# Patient Record
Sex: MALE | Race: WHITE
[De-identification: names, ages, dates, MRNs, and addresses within clinical notes are randomized per-mention and may not be internally consistent; named-entity substitution may affect disease eponyms.]

---

## 2023-02-10 ENCOUNTER — NURSE TRIAGE (OUTPATIENT)
Dept: OTHER | Facility: CLINIC | Age: 28
End: 2023-02-10

## 2023-02-20 ENCOUNTER — OFFICE VISIT (OUTPATIENT)
Dept: FAMILY MEDICINE CLINIC | Facility: CLINIC | Age: 28
End: 2023-02-20
Payer: COMMERCIAL

## 2023-02-20 VITALS
OXYGEN SATURATION: 98 % | DIASTOLIC BLOOD PRESSURE: 74 MMHG | BODY MASS INDEX: 30.79 KG/M2 | SYSTOLIC BLOOD PRESSURE: 122 MMHG | WEIGHT: 196.2 LBS | HEIGHT: 67 IN | HEART RATE: 97 BPM

## 2023-02-20 DIAGNOSIS — G89.29 CHRONIC LEFT-SIDED LOW BACK PAIN WITH LEFT-SIDED SCIATICA: ICD-10-CM

## 2023-02-20 DIAGNOSIS — M54.42 CHRONIC LEFT-SIDED LOW BACK PAIN WITH LEFT-SIDED SCIATICA: ICD-10-CM

## 2023-02-20 DIAGNOSIS — Z00.00 HEALTHCARE MAINTENANCE: ICD-10-CM

## 2023-02-20 DIAGNOSIS — K62.5 BRIGHT RED RECTAL BLEEDING: Primary | ICD-10-CM

## 2023-02-20 PROCEDURE — 99203 OFFICE O/P NEW LOW 30 MIN: CPT | Performed by: FAMILY MEDICINE

## 2023-02-20 SDOH — ECONOMIC STABILITY: INCOME INSECURITY: HOW HARD IS IT FOR YOU TO PAY FOR THE VERY BASICS LIKE FOOD, HOUSING, MEDICAL CARE, AND HEATING?: NOT HARD AT ALL

## 2023-02-20 SDOH — ECONOMIC STABILITY: FOOD INSECURITY: WITHIN THE PAST 12 MONTHS, YOU WORRIED THAT YOUR FOOD WOULD RUN OUT BEFORE YOU GOT MONEY TO BUY MORE.: NEVER TRUE

## 2023-02-20 SDOH — ECONOMIC STABILITY: FOOD INSECURITY: WITHIN THE PAST 12 MONTHS, THE FOOD YOU BOUGHT JUST DIDN'T LAST AND YOU DIDN'T HAVE MONEY TO GET MORE.: NEVER TRUE

## 2023-02-20 SDOH — ECONOMIC STABILITY: HOUSING INSECURITY
IN THE LAST 12 MONTHS, WAS THERE A TIME WHEN YOU DID NOT HAVE A STEADY PLACE TO SLEEP OR SLEPT IN A SHELTER (INCLUDING NOW)?: NO

## 2023-02-20 ASSESSMENT — PATIENT HEALTH QUESTIONNAIRE - PHQ9
SUM OF ALL RESPONSES TO PHQ QUESTIONS 1-9: 0
SUM OF ALL RESPONSES TO PHQ QUESTIONS 1-9: 0
SUM OF ALL RESPONSES TO PHQ9 QUESTIONS 1 & 2: 0
1. LITTLE INTEREST OR PLEASURE IN DOING THINGS: 0
SUM OF ALL RESPONSES TO PHQ QUESTIONS 1-9: 0
2. FEELING DOWN, DEPRESSED OR HOPELESS: 0
SUM OF ALL RESPONSES TO PHQ QUESTIONS 1-9: 0

## 2023-02-20 ASSESSMENT — ENCOUNTER SYMPTOMS: RESPIRATORY NEGATIVE: 1

## 2023-02-20 NOTE — PROGRESS NOTES
PROGRESS NOTE    SUBJECTIVE:   Abiel Carranza is a 33 y.o. male seen for a follow up visit regarding   Chief Complaint   Patient presents with    New Patient     Establish care  Reports 2-3 weeks ago he noticed blood in his stool; believes it was related to taking a lot of Aleve. Reports blood stopped when he discontinued Aleve. Had upset stomach and discomfort. Complains of back pain since he started working and graduated from [employer]. Believes it is left sided sciatic pain. HPI:  New patient, wanting to establish primary care with us. Reports a long history of back pain, since high school. Having to take Aleve daily for pain. Recently developed some rectal bleeding, and thought maybe it was from his Aleve. He denies having epigastric pain presently. He denies melena. He denies feeling any bulges or hemorrhoids around his rectum when he showers. Back pain has not been worked up. He works as a , having difficulty with his duties, has to do a lot of bending. Patient denies family history of colon cancer, colitis. He denies diarrhea. He is having no abdominal pain presently. Reviewed and updated this visit by provider:  Tobacco  Allergies  Meds  Problems  Med Hx  Surg Hx  Fam Hx           Review of Systems   Respiratory: Negative. Cardiovascular: Negative. OBJECTIVE:  Vitals:    02/20/23 1640   BP: 122/74   Site: Left Upper Arm   Cuff Size: Large Adult   Pulse: 97   SpO2: 98%   Weight: 196 lb 3.2 oz (89 kg)   Height: 5' 7\" (1.702 m)        Physical Exam   General: Alert and oriented x3, well-appearing  Neck: No adenopathy, thyromegaly or thyroid nodules  Pulmonary: Normal effort, good airflow, no rales or rhonchi  CVS: Regular rate and rhythm, normal S1, S2, no S3 or S4, no murmurs; no carotid bruits, 2+ pedal pulses  Abdomen: Soft, nontender, no mass organomegaly  Musculoskeletal: Mild tenderness in the low back, straight leg raising test is negative.   Rectal: Deferred (referring for colonoscopy)    Medical problems and test results were reviewed with the patient today. No results found for this or any previous visit (from the past 672 hour(s)). No results found for any visits on 02/20/23. ASSESSMENT and PLAN    1. Bright red rectal bleeding  -     Sunday Giacomo Menard MD, Gastroenterology, [place]  2. Chronic left-sided low back pain with left-sided sciatica  -     [address] [place] [address], [facility]   3. Healthcare maintenance  -     Basic Metabolic Panel; Future  -     CBC with Auto Differential; Future  -     Hepatic Function Panel; Future  -     Hepatitis C Antibody; Future  -     HIV 1/2 Ag/Ab, 4TH Generation,W Rflx Confirm; Future  -     Lipid Panel; Future  -     TSH; Future          Return if symptoms worsen or fail to improve.        Che Dowling MD

## 2023-03-06 ENCOUNTER — OFFICE VISIT (OUTPATIENT)
Dept: ORTHOPEDIC SURGERY | Age: 28
End: 2023-03-06
Payer: COMMERCIAL

## 2023-03-06 VITALS — BODY MASS INDEX: 31.67 KG/M2 | HEIGHT: 67 IN | WEIGHT: 201.8 LBS

## 2023-03-06 DIAGNOSIS — M51.36 LUMBAR DEGENERATIVE DISC DISEASE: ICD-10-CM

## 2023-03-06 DIAGNOSIS — M54.50 LOW BACK PAIN, UNSPECIFIED BACK PAIN LATERALITY, UNSPECIFIED CHRONICITY, UNSPECIFIED WHETHER SCIATICA PRESENT: Primary | ICD-10-CM

## 2023-03-06 DIAGNOSIS — M54.16 LUMBAR RADICULOPATHY: ICD-10-CM

## 2023-03-06 PROCEDURE — 99204 OFFICE O/P NEW MOD 45 MIN: CPT | Performed by: NURSE PRACTITIONER

## 2023-03-06 RX ORDER — CYCLOBENZAPRINE HCL 10 MG
10 TABLET ORAL 3 TIMES DAILY PRN
Qty: 21 TABLET | Refills: 0 | Status: SHIPPED | OUTPATIENT
Start: 2023-03-06 | End: 2023-03-16

## 2023-03-06 RX ORDER — CELECOXIB 200 MG/1
200 CAPSULE ORAL DAILY PRN
Qty: 30 CAPSULE | Refills: 0 | Status: SHIPPED | OUTPATIENT
Start: 2023-03-06

## 2023-03-06 NOTE — PROGRESS NOTES
Name: Fariha Park  YOB: 1995  Gender: male  MRN: [id number]    CC: Chronic back pain, left leg pain    HPI: This is a 33y.o. year old male who has a longstanding history of back pain. Recently has gotten worse over the past 6 months. He is a  and does a lot of bending and twisting. It hurts across the lower back primarily at L5 then he has pain that radiates down the left posterior leg at times all the way to his foot. He was taking Aleve and found this helpful however was concerned about a possible bleeding hemorrhoid. Also had a little bit of GI upset that resolved when he stopped taking the Aleve. Denies any gastric ulcer history. The patient denies any change in bowel or bladder function since the onset of the symptoms. he  has not had lumbar surgery in the past.      Thus far, the patient has tried tylenol    Current pain level: Activities limited by pain:        AMB PAIN ASSESSMENT 3/6/2023   Location of Pain Back   Severity of Pain 10   Frequency of Pain Constant   Limiting Behavior Yes   Result of Injury No   Work-Related Injury No   Are there other pain locations you wish to document? No            ROS/Meds/PSH/PMH/FH/SH: I personally reviewed the patient's collected intake data. Below are the pertinents:    No Known Allergies      Current Outpatient Medications:     celecoxib (CELEBREX) 200 MG capsule, Take 1 capsule by mouth daily as needed for Pain, Disp: 30 capsule, Rfl: 0    cyclobenzaprine (FLEXERIL) 10 MG tablet, Take 1 tablet by mouth 3 times daily as needed for Muscle spasms, Disp: 21 tablet, Rfl: 0    History reviewed. No pertinent surgical history. There is no problem list on file for this patient. Tobacco:  reports that he has never smoked. He has quit using smokeless tobacco.  His smokeless tobacco use included chew.   Alcohol:   Social History     Substance and Sexual Activity   Alcohol Use Yes    Alcohol/week: 3.0 standard drinks    Types: 3 Cans of beer per week          Physical Exam:   BMI: Body mass index is 31.8 kg/m². GENERAL:  Adult in no acute distress, well developed, well nourished Patient is appropriately conversant  MSK:  Examination of the lumbar spine reveals paraspinal tenderness , facet tenderness   There is moderate tenderness to palpation along the spinous processes and paraspinal musculature. The patient ambulates with a normal gait. ROM of bilateral hip(s) reveals no irritability. NEURO:  Cranial nerves grossly intact. No motor deficits. Straight leg testing is negative bilaterally   Sensory testing reveals intact sensation to light touch and in the distribution of the L3-S1 dermatomes bilaterally  Ankle jerk is negative for clonus    Reflexes   Right Left   Quadriceps (L4) 2 2   Achilles (S1) 2 2     Strength testing in the lower extremity reveals the following based on the 5 point grading scale:     HF (L2) H Ab (L5) KE (L3/4) ADF (L4) EHL (L5) A Ev (S1) APF (S1)   Right 5 5 5 5 5 5 5   Left 5 5 5 5 5 5 5     PSYCH:  Alert and oriented X 3. Appropriate affect. Intact judgment and insight. Radiographic Studies:     AP, lateral and spot views of the lumbar spine:      there is good lordosis. There is moderate disc space narrowing at L5-S1. No fractures or lesions. Interpretation: L5-S1 degenerative disc disease      Assessment/Plan:       Diagnosis Orders   1. Low back pain, unspecified back pain laterality, unspecified chronicity, unspecified whether sciatica present  XR LUMBAR SPINE (2-3 VIEWS)      2. Lumbar radiculopathy        3. Lumbar degenerative disc disease            This patient's clinical history and physical exam is consistent with a left  L-5 and S-1 lumbar radiculopathy. This is associated with disc degeneration at L5-S1. I told patient I will give him Celebrex to utilize sparingly. I did tell him to take 20 mg of Pepcid with this medication for gastric protectant.   If he develops any signs of any kind of bleeding to discontinue the medication. We did discuss that Celebrex is easier on the stomach. Also has less tendency to aggravate any kind of bleeding. Will add a muscle relaxer. He will complete a home exercise program if no improvement will look at getting an MRI of the lumbar spine. We discussed the natural history of lumbar radiculopathy in that many of these patients have near complete resolution of their symptoms within eight to twelve weeks with conservative care. We discussed that conservative treatments typically start with activity modification, and medication followed by physical therapy as symptoms allow. Oral and/or epidural steroids are other options. I also discussed potential surgical options if the symptoms fail to improve or there is a progressive neurologic deficit and conservative management has been exhausted. We discussed that surgery is not typically a reliable treatment for isolated back pain, but is usually very reliable in relieving buttock and leg symptoms.        - A home exercise program was prescribed for stretching and strengthening. A list of exercises was provided. - NSAID: The patient is agreeable to a trial of nonsteroidal anti-inflammatory drugs (NSAIDs). We discussed risks associated with their use, including GI tract or other bleeding, and also some cardiac risk. Instructions were given to discontinue the NSAID if there is any sign of GI bleed, chest pain, or shortness of breath. Continued use of NSAIDS is recommended to be managed by PCP to monitor kidney and liver function.  - Muscle Relaxant: The patient was prescribed a muscle relaxant. The patient understands that this is a temporary measure to bring acute spasm under control.       4 This is a undiagnosed new problem with uncertain prognosis    Orders Placed This Encounter   Medications    celecoxib (CELEBREX) 200 MG capsule     Sig: Take 1 capsule by mouth daily as needed for Pain     Dispense: 30 capsule     Refill:  0    cyclobenzaprine (FLEXERIL) 10 MG tablet     Sig: Take 1 tablet by mouth 3 times daily as needed for Muscle spasms     Dispense:  21 tablet     Refill:  0        Orders Placed This Encounter   Procedures    XR LUMBAR SPINE (2-3 VIEWS)            Return in about 6 weeks (around 4/17/2023). ARTI Reilly - CNP  03/06/23      Elements of this note were created using speech recognition software. As such, errors of speech recognition may be present.

## 2023-04-03 RX ORDER — CELECOXIB 200 MG/1
200 CAPSULE ORAL DAILY PRN
Qty: 30 CAPSULE | Refills: 0 | OUTPATIENT
Start: 2023-04-03

## 2023-04-17 ENCOUNTER — OFFICE VISIT (OUTPATIENT)
Dept: ORTHOPEDIC SURGERY | Age: 28
End: 2023-04-17
Payer: COMMERCIAL

## 2023-04-17 VITALS — HEIGHT: 67 IN | WEIGHT: 201 LBS | BODY MASS INDEX: 31.55 KG/M2

## 2023-04-17 DIAGNOSIS — M54.16 LUMBAR RADICULOPATHY: Primary | ICD-10-CM

## 2023-04-17 DIAGNOSIS — M51.36 LUMBAR DEGENERATIVE DISC DISEASE: ICD-10-CM

## 2023-04-17 PROCEDURE — 99214 OFFICE O/P EST MOD 30 MIN: CPT | Performed by: NURSE PRACTITIONER

## 2023-04-17 RX ORDER — CELECOXIB 200 MG/1
200 CAPSULE ORAL DAILY PRN
Qty: 30 CAPSULE | Refills: 2 | Status: SHIPPED | OUTPATIENT
Start: 2023-04-17

## 2023-04-17 NOTE — PROGRESS NOTES
standard drinks    Types: 3 Cans of beer per week          Radiographic Studies:       MRI Results (most recent):      Assessment/Plan:        ICD-10-CM    1. Lumbar radiculopathy  M54.16       2. Lumbar degenerative disc disease  M51.36            Patient is doing very well with Celebrex and home exercises. We discussed possibly proceeding with an MRI of the lumbar spine but he does not feel that this is necessary at the current time. Therefore we will just continue the Celebrex. Patient educated on long-term effects of NSAID use. At any time he can follow-up and we will be happy to order an MRI of the lumbar spine if he has elevated levels of pain.    -The patient will be treated observantly with self directed symptomatic care. Instructions were given to follow up if there is any neurologic or functional decline. - A home exercise program was prescribed for stretching and strengthening. A list of exercises was provided. - NSAID: The patient is agreeable to a trial of nonsteroidal anti-inflammatory drugs (NSAIDs). We discussed risks associated with their use, including GI tract or other bleeding, and also some cardiac risk. Instructions were given to discontinue the NSAID if there is any sign of GI bleed, chest pain, or shortness of breath. Continued use of NSAIDS is recommended to be managed by PCP to monitor kidney and liver function. Orders Placed This Encounter   Medications    celecoxib (CELEBREX) 200 MG capsule     Sig: Take 1 capsule by mouth daily as needed for Pain     Dispense:  30 capsule     Refill:  2        No orders of the defined types were placed in this encounter. 4 This is a chronic illness/condition with exacerbation and progression      No follow-ups on file. ARTI Dixon CNP  04/17/23      Elements of this note were created using speech recognition software. As such, errors of speech recognition may be present.

## 2023-05-30 ENCOUNTER — NURSE TRIAGE (OUTPATIENT)
Dept: OTHER | Facility: CLINIC | Age: 28
End: 2023-05-30

## 2023-05-30 NOTE — TELEPHONE ENCOUNTER
Location of patient: [place]    Received call from Woodfin Opitz at [facility] with [employer] Subjective: Caller states \"Saturday I got up and wasn't feeling too well. Saturday night it was hard for me to go to sleep, I kept waking up from sweats. I was getting really hot, then really cold. Sunday I threw up and I've kind of been nauseous ever since. I feel a little better today. I was able to put something in my stomach and not throw it back up. I'm still having diarrhea. \"     Current Symptoms: diarrhea 4-5 times in 3 days, nausea, sweats/chills at night-improving, vomiting x1, urinating less frequently, lightheaded, dry mouth    Onset: 4 days ago; improving    Associated Symptoms: reduced appetite, reduced fluid intake, diarrhea    Pain Severity: Denies    Temperature: Unable to check    What has been tried: bland diet    LMP: NA Pregnant: NA    Recommended disposition: Go to ED/UCC Now (Or to Office with PCP Approval). Patient agreeable. Care advice provided, patient verbalizes understanding; denies any other questions or concerns; instructed to call back for any new or worsening symptoms. Writer provided warm transfer to [place] at [facility] for 2nd Level Triage. Attention Provider: Thank you for allowing me to participate in the care of your patient. The patient was connected to triage in response to information provided to the ECC/PSC. Please do not respond through this encounter as the response is not directed to a shared pool.     Reason for Disposition   Drinking very little and has signs of dehydration (e.g., no urine > 12 hours, very dry mouth, very lightheaded)    Protocols used: Diarrhea-ADULT-[place]

## 2023-05-31 ENCOUNTER — OFFICE VISIT (OUTPATIENT)
Dept: FAMILY MEDICINE CLINIC | Facility: CLINIC | Age: 28
End: 2023-05-31
Payer: COMMERCIAL

## 2023-05-31 VITALS
WEIGHT: 199.4 LBS | HEART RATE: 85 BPM | BODY MASS INDEX: 31.3 KG/M2 | SYSTOLIC BLOOD PRESSURE: 130 MMHG | OXYGEN SATURATION: 98 % | DIASTOLIC BLOOD PRESSURE: 82 MMHG | TEMPERATURE: 98.4 F | HEIGHT: 67 IN

## 2023-05-31 DIAGNOSIS — K52.9 ACUTE GASTROENTERITIS: Primary | ICD-10-CM

## 2023-05-31 PROCEDURE — 99213 OFFICE O/P EST LOW 20 MIN: CPT | Performed by: FAMILY MEDICINE

## 2023-05-31 RX ORDER — ONDANSETRON 4 MG/1
4 TABLET, ORALLY DISINTEGRATING ORAL EVERY 8 HOURS PRN
Qty: 20 TABLET | Refills: 1 | Status: SHIPPED | OUTPATIENT
Start: 2023-05-31

## 2023-05-31 ASSESSMENT — PATIENT HEALTH QUESTIONNAIRE - PHQ9
SUM OF ALL RESPONSES TO PHQ QUESTIONS 1-9: 0
2. FEELING DOWN, DEPRESSED OR HOPELESS: 0
1. LITTLE INTEREST OR PLEASURE IN DOING THINGS: 0
SUM OF ALL RESPONSES TO PHQ9 QUESTIONS 1 & 2: 0
SUM OF ALL RESPONSES TO PHQ QUESTIONS 1-9: 0

## 2023-05-31 ASSESSMENT — ENCOUNTER SYMPTOMS: RESPIRATORY NEGATIVE: 1

## 2023-05-31 NOTE — PROGRESS NOTES
PROGRESS NOTE    SUBJECTIVE:   Jody Vincent is a 33 y.o. male seen for a follow up visit regarding   Chief Complaint   Patient presents with    Nausea & Vomiting     Complains of nausea, vomiting (once), headache (Saturday) and diarrhea x 4 days. Reports this morning his bowel movement was dark yellow brownish color. HPI:  Sick this weekend, headache initially, with cold sweats, then nausea, vomited once, has had diarrhea. Better today, ate breakfast this a.m. w/o nausea. Reviewed and updated this visit by provider:  Tobacco  Allergies  Meds  Problems  Med Hx  Surg Hx  Fam Hx           Review of Systems   Respiratory: Negative. Cardiovascular: Negative. OBJECTIVE:  Vitals:    05/31/23 0955   BP: 130/82   Site: Left Upper Arm   Cuff Size: Medium Adult   Pulse: 85   Temp: 98.4 °F (36.9 °C)   TempSrc: Oral   SpO2: 98%   Weight: 199 lb 6.4 oz (90.4 kg)   Height: 5' 6.8\" (1.697 m)        Physical Exam   General: Alert and oriented x3, well-appearing  Neck: No adenopathy, thyromegaly or thyroid nodules  Pulmonary: Normal effort, good airflow, no rales or rhonchi  CVS: Regular rate and rhythm, normal S1, S2, no S3 or S4, no murmurs; no carotid bruits, 2+ pedal pulses  Abdomen: Nondistended, normal bowel sounds, soft without mass organomegaly    Medical problems and test results were reviewed with the patient today. No results found for this or any previous visit (from the past 672 hour(s)). No results found for any visits on 05/31/23. ASSESSMENT and PLAN    1. Acute gastroenteritis  -     ondansetron (ZOFRAN-ODT) 4 MG disintegrating tablet; Take 1 tablet by mouth every 8 hours as needed for Nausea or Vomiting, Disp-20 tablet, R-1Normal         No follow-ups on file.        Link MD Duncan

## 2023-06-26 ENCOUNTER — TELEPHONE (OUTPATIENT)
Dept: ORTHOPEDIC SURGERY | Age: 28
End: 2023-06-26

## 2023-06-26 RX ORDER — CELECOXIB 200 MG/1
200 CAPSULE ORAL DAILY
Qty: 30 CAPSULE | Refills: 2 | Status: SHIPPED | OUTPATIENT
Start: 2023-06-26

## 2023-08-16 ENCOUNTER — TELEPHONE (OUTPATIENT)
Dept: FAMILY MEDICINE CLINIC | Facility: CLINIC | Age: 28
End: 2023-08-16

## 2023-09-19 RX ORDER — CELECOXIB 200 MG/1
CAPSULE ORAL DAILY
Qty: 30 CAPSULE | Refills: 2 | OUTPATIENT
Start: 2023-09-19

## 2024-09-18 ENCOUNTER — TELEPHONE (OUTPATIENT)
Dept: ORTHOPEDIC SURGERY | Age: 29
End: 2024-09-18

## 2024-09-23 ENCOUNTER — OFFICE VISIT (OUTPATIENT)
Dept: ORTHOPEDIC SURGERY | Age: 29
End: 2024-09-23
Payer: COMMERCIAL

## 2024-09-23 DIAGNOSIS — S92.215A CLOSED NONDISPLACED FRACTURE OF CUBOID OF LEFT FOOT, INITIAL ENCOUNTER: Primary | ICD-10-CM

## 2024-09-23 DIAGNOSIS — R52 PAIN: ICD-10-CM

## 2024-09-23 PROCEDURE — 99204 OFFICE O/P NEW MOD 45 MIN: CPT | Performed by: PHYSICIAN ASSISTANT

## 2024-09-23 PROCEDURE — L4361 PNEUMA/VAC WALK BOOT PRE OTS: HCPCS | Performed by: PHYSICIAN ASSISTANT

## 2024-09-23 RX ORDER — MELOXICAM 15 MG/1
15 TABLET ORAL DAILY
Qty: 30 TABLET | Refills: 3 | Status: SHIPPED | OUTPATIENT
Start: 2024-09-23

## 2024-10-16 ENCOUNTER — OFFICE VISIT (OUTPATIENT)
Dept: ORTHOPEDIC SURGERY | Age: 29
End: 2024-10-16
Payer: COMMERCIAL

## 2024-10-16 DIAGNOSIS — S92.215D CLOSED NONDISPLACED FRACTURE OF CUBOID OF LEFT FOOT WITH ROUTINE HEALING, SUBSEQUENT ENCOUNTER: ICD-10-CM

## 2024-10-16 DIAGNOSIS — R52 PAIN: Primary | ICD-10-CM

## 2024-10-16 PROCEDURE — 99213 OFFICE O/P EST LOW 20 MIN: CPT | Performed by: PHYSICIAN ASSISTANT

## 2024-10-16 PROCEDURE — L1902 AFO ANKLE GAUNTLET PRE OTS: HCPCS | Performed by: PHYSICIAN ASSISTANT

## 2024-10-16 NOTE — PROGRESS NOTES
Name: Arvin Soliz  YOB: 1995  Gender: male  MRN: [id number]    Treatment Plan:   I had a thorough discussion with the patient regarding the treatment plan   Left cuboid avulsion fracture    Transition from CAM boot to ASO brace.  ASO brace given today.  This is medically necessary as patient transitions from the boot to the brace for increased stability and functionality    Patient understands and in full agreement with the treatment plan.    Weight-bearing status: WBAT        Return to work/work restrictions:  May return to work on full duty with no restrictions and ASO brace  No medications given    F/u 6 weeks w/ no Xray     CC: left foot pain    HPI: Arvin Soliz is a 29 y.o. male presents with right foot pain that has been present for approximately 1 week.  Patient states that he was going down the steps last Monday when he fell causing his foot to roll underneath him.  He went to urgent care and was placed in a short walking boot.  He states that most of his pain is on the lateral aspect of the foot, however, he does have some pain along the anterior aspect of the ankle overlying the talus.  He states that 4 to 5 years ago he had an injury to the talus.  He has been ambulating in the boot.    10/16/2024-patient states that he is doing much better than his last office visit.  He has been ambulating in the boot without difficulty.  He states that his pain is significantly improved and only has pain when he does something he is not supposed to do.  He is ready to get out of the boot at this time.    Attempted Treatment: Short walking boot    ROS:  Patient Denies fever/chills, headache, visual changes, chest pain, shortness of breath, and nausea/vomiting/diarrhea     Tobacco:  reports that he has never smoked. He has quit using smokeless tobacco.  His smokeless tobacco use included chew.  Diabetes: None  No results found for: \"LABA1C\"    No past medical history on file.    Current Outpatient

## 2024-10-16 NOTE — PROGRESS NOTES
The patient was prescribed a Wraptor brace for the patient's leftfoot. The patient wears a size 9 shoe and I fitted the patient with a M brace. I explained how to fit the brace properly by pulling the lace tabs across top of foot first then under arch and lastly pulling the strap up firmly and attaching to the lateral Velcro strip. Thus forming a figure 8 across the ankle joint. Once the figure 8 is completed they are to secure the top (short circumferential) straps to help avoid the straps from loosening with normal wear.  The patient was able to demonstrate proper fitting in office to ensure compliance with device and acknowledged satisfaction with current fit. Patient read and signed documenting they understand and agree to [facility]'s current DME return policy.

## 2024-11-19 ENCOUNTER — TELEPHONE (OUTPATIENT)
Dept: ORTHOPEDIC SURGERY | Age: 29
End: 2024-11-19

## 2024-11-19 NOTE — TELEPHONE ENCOUNTER
LVM to move appt to earlier time slot 11/20/24. Requested call back to reschedule. If pt calls back, please reschedule at pt's convenience at earlier time slot than 2pm on Teresita's schedule (OK to overbook).

## 2024-12-02 ENCOUNTER — OFFICE VISIT (OUTPATIENT)
Dept: ORTHOPEDIC SURGERY | Age: 29
End: 2024-12-02
Payer: COMMERCIAL

## 2024-12-02 DIAGNOSIS — S92.215D CLOSED NONDISPLACED FRACTURE OF CUBOID OF LEFT FOOT WITH ROUTINE HEALING, SUBSEQUENT ENCOUNTER: Primary | ICD-10-CM

## 2024-12-02 PROCEDURE — 99213 OFFICE O/P EST LOW 20 MIN: CPT | Performed by: PHYSICIAN ASSISTANT

## 2024-12-02 NOTE — PROGRESS NOTES
Name: Arvin Soliz  YOB: 1995  Gender: male  MRN: [id number]    Treatment Plan:   I had a thorough discussion with the patient regarding the treatment plan   Left cuboid avulsion fracture    Increase activities as tolerated  Ankle HEP given today.    Patient understands and in full agreement with the treatment plan.    Weight-bearing status: WBAT        Return to work/work restrictions: none  No medications given    F/u as needed     CC: left foot pain    HPI: Arivn Soliz is a 29 y.o. male presents with right foot pain that has been present for approximately 1 week.  Patient states that he was going down the steps last Monday when he fell causing his foot to roll underneath him.  He went to urgent care and was placed in a short walking boot.  He states that most of his pain is on the lateral aspect of the foot, however, he does have some pain along the anterior aspect of the ankle overlying the talus.  He states that 4 to 5 years ago he had an injury to the talus.  He has been ambulating in the boot.    10/16/2024-patient states that he is doing much better than his last office visit.  He has been ambulating in the boot without difficulty.  He states that his pain is significantly improved and only has pain when he does something he is not supposed to do.  He is ready to get out of the boot at this time.    12/2/2024-patient states that he is doing very well.  He has not been wearing the ASO brace for over a week now and has no pain.  He states that he is able to do everything that he needs to do without any difficulty.  He is pleased with his clinical course    Attempted Treatment: Short walking boot    ROS:  Patient Denies fever/chills, headache, visual changes, chest pain, shortness of breath, and nausea/vomiting/diarrhea     Tobacco:  reports that he has never smoked. He has quit using smokeless tobacco.  His smokeless tobacco use included chew.  Diabetes: None  No results found for:

## 2025-02-04 ENCOUNTER — TELEPHONE (OUTPATIENT)
Dept: FAMILY MEDICINE CLINIC | Facility: CLINIC | Age: 30
End: 2025-02-04

## 2025-02-04 DIAGNOSIS — Z30.09 VASECTOMY EVALUATION: Primary | ICD-10-CM

## 2025-02-04 NOTE — TELEPHONE ENCOUNTER
Patient contacted and advised provider placed Urology referral for him. Patient voiced understanding and thanks.